# Patient Record
Sex: MALE | Race: WHITE | NOT HISPANIC OR LATINO | Employment: FULL TIME | URBAN - METROPOLITAN AREA
[De-identification: names, ages, dates, MRNs, and addresses within clinical notes are randomized per-mention and may not be internally consistent; named-entity substitution may affect disease eponyms.]

---

## 2022-06-09 ENCOUNTER — TELEPHONE (OUTPATIENT)
Dept: SLEEP CENTER | Facility: CLINIC | Age: 67
End: 2022-06-09

## 2022-06-09 NOTE — TELEPHONE ENCOUNTER
----- Message from Anshu Wilson MD sent at 6/9/2022 10:24 AM EDT -----  Approved  ----- Message -----  From: Holly Chavarria  Sent: 5/6/5362  12:25 PM EDT  To: [facility] Provider    This DIAGNOSTIC sleep study needs approval      If approved please sign and return to clerical pool  If denied please include reasons why  Also provide alternative testing if warranted  Please sign and return to clerical pool

## 2022-09-01 ENCOUNTER — HOSPITAL ENCOUNTER (OUTPATIENT)
Dept: SLEEP CENTER | Facility: HOSPITAL | Age: 67
Discharge: HOME | End: 2022-09-01
Payer: COMMERCIAL

## 2022-09-01 DIAGNOSIS — R06.81 APNEA: ICD-10-CM

## 2022-09-01 PROCEDURE — 95810 POLYSOM 6/> YRS 4/> PARAM: CPT

## 2022-09-02 NOTE — PROGRESS NOTES
Sleep Study Documentation    Pre-Sleep Study       Sleep testing procedure explained to patient:YES    Patient napped prior to study:YES- less than 30 minutes  Napped after 2PM: yes    Caffeine:Dayshift worker after 12PM   Caffeine use:YES- energy drinks  1 serving    Alcohol:Dayshift workers after 5PM: Alcohol use:NO    Typical day for patient:YES       Study Documentation    Sleep Study Indications: Snoring and EDS    Sleep Study: Diagnostic   Snore:Severe  Supplemental O2: no    O2 flow rate (L/min) range N/A  O2 flow rate (L/min) final N/A  Minimum SaO2 60%  Baseline SaO2 96%        EKG abnormalities: yes:  EPOCH example and comments: PVCs  Epoch 831, 876, 890, 941, etc    EEG abnormalities: no    Sleep Study Recorded < 2 hours: N/A    Sleep Study Recorded > 2 hours but incomplete study: N/A    Sleep Study Recorded 6 hours but no sleep obtained: NO    Patient classification: retired       Post-Sleep Study    Medication used at bedtime or during sleep study:NO    Patient reports time it took to fall asleep:20 to 30 minutes    Patient reports waking up during study:3 or more times  Patient reports returning to sleep in 10 to 30 minutes  Patient reports sleeping 4 to 6 hours with dreaming  Patient reports sleep during study:typical    Patient rated sleepiness: Somewhat sleepy or tired    PAP treatment:no

## 2022-09-09 ENCOUNTER — TELEPHONE (OUTPATIENT)
Dept: SLEEP CENTER | Facility: CLINIC | Age: 67
End: 2022-09-09

## 2022-09-09 NOTE — TELEPHONE ENCOUNTER
Called patient and advised sleep study resulted and shows severe GERMÁN and hypoxia  Per study order, PCP Dr Major Reyes to follow up  Patient states he will reach out to Dr Major Reyes

## 2024-11-11 ENCOUNTER — HOSPITAL ENCOUNTER (EMERGENCY)
Facility: HOSPITAL | Age: 69
Discharge: HOME | End: 2024-11-12
Attending: EMERGENCY MEDICINE
Payer: COMMERCIAL

## 2024-11-11 DIAGNOSIS — I10 ESSENTIAL HYPERTENSION: ICD-10-CM

## 2024-11-11 DIAGNOSIS — K80.50 BILIARY COLIC: Primary | ICD-10-CM

## 2024-11-11 LAB
ALBUMIN SERPL BCG-MCNC: 4.5 G/DL (ref 3.5–5)
ALP SERPL-CCNC: 81 U/L (ref 34–104)
ALT SERPL W P-5'-P-CCNC: 38 U/L (ref 7–52)
ANION GAP SERPL CALCULATED.3IONS-SCNC: 7 MMOL/L (ref 4–13)
AST SERPL W P-5'-P-CCNC: 31 U/L (ref 13–39)
BASOPHILS # BLD AUTO: 0.06 THOUSANDS/ÂΜL (ref 0–0.1)
BASOPHILS NFR BLD AUTO: 1 % (ref 0–1)
BILIRUB SERPL-MCNC: 0.81 MG/DL (ref 0.2–1)
BUN SERPL-MCNC: 18 MG/DL (ref 5–25)
CALCIUM SERPL-MCNC: 9.9 MG/DL (ref 8.4–10.2)
CHLORIDE SERPL-SCNC: 102 MMOL/L (ref 96–108)
CO2 SERPL-SCNC: 28 MMOL/L (ref 21–32)
CREAT SERPL-MCNC: 1.09 MG/DL (ref 0.6–1.3)
EOSINOPHIL # BLD AUTO: 0.04 THOUSAND/ÂΜL (ref 0–0.61)
EOSINOPHIL NFR BLD AUTO: 0 % (ref 0–6)
ERYTHROCYTE [DISTWIDTH] IN BLOOD BY AUTOMATED COUNT: 13.4 % (ref 11.6–15.1)
GFR SERPL CREATININE-BSD FRML MDRD: 69 ML/MIN/1.73SQ M
GLUCOSE SERPL-MCNC: 156 MG/DL (ref 65–140)
HCT VFR BLD AUTO: 43.8 % (ref 36.5–49.3)
HGB BLD-MCNC: 14.7 G/DL (ref 12–17)
IMM GRANULOCYTES # BLD AUTO: 0.06 THOUSAND/UL (ref 0–0.2)
IMM GRANULOCYTES NFR BLD AUTO: 1 % (ref 0–2)
LIPASE SERPL-CCNC: 21 U/L (ref 11–82)
LYMPHOCYTES # BLD AUTO: 0.59 THOUSANDS/ÂΜL (ref 0.6–4.47)
LYMPHOCYTES NFR BLD AUTO: 5 % (ref 14–44)
MCH RBC QN AUTO: 29.5 PG (ref 26.8–34.3)
MCHC RBC AUTO-ENTMCNC: 33.6 G/DL (ref 31.4–37.4)
MCV RBC AUTO: 88 FL (ref 82–98)
MONOCYTES # BLD AUTO: 0.38 THOUSAND/ÂΜL (ref 0.17–1.22)
MONOCYTES NFR BLD AUTO: 4 % (ref 4–12)
NEUTROPHILS # BLD AUTO: 9.76 THOUSANDS/ÂΜL (ref 1.85–7.62)
NEUTS SEG NFR BLD AUTO: 89 % (ref 43–75)
NRBC BLD AUTO-RTO: 0 /100 WBCS
PLATELET # BLD AUTO: 294 THOUSANDS/UL (ref 149–390)
PMV BLD AUTO: 10 FL (ref 8.9–12.7)
POTASSIUM SERPL-SCNC: 3.7 MMOL/L (ref 3.5–5.3)
PROT SERPL-MCNC: 7.8 G/DL (ref 6.4–8.4)
RBC # BLD AUTO: 4.99 MILLION/UL (ref 3.88–5.62)
SODIUM SERPL-SCNC: 137 MMOL/L (ref 135–147)
WBC # BLD AUTO: 10.89 THOUSAND/UL (ref 4.31–10.16)

## 2024-11-11 PROCEDURE — 99284 EMERGENCY DEPT VISIT MOD MDM: CPT

## 2024-11-11 PROCEDURE — 99285 EMERGENCY DEPT VISIT HI MDM: CPT | Performed by: EMERGENCY MEDICINE

## 2024-11-11 PROCEDURE — 36415 COLL VENOUS BLD VENIPUNCTURE: CPT | Performed by: EMERGENCY MEDICINE

## 2024-11-11 PROCEDURE — 80053 COMPREHEN METABOLIC PANEL: CPT | Performed by: EMERGENCY MEDICINE

## 2024-11-11 PROCEDURE — 85025 COMPLETE CBC W/AUTO DIFF WBC: CPT | Performed by: EMERGENCY MEDICINE

## 2024-11-11 PROCEDURE — 96361 HYDRATE IV INFUSION ADD-ON: CPT

## 2024-11-11 PROCEDURE — 93005 ELECTROCARDIOGRAM TRACING: CPT

## 2024-11-11 PROCEDURE — 96375 TX/PRO/DX INJ NEW DRUG ADDON: CPT

## 2024-11-11 PROCEDURE — 96374 THER/PROPH/DIAG INJ IV PUSH: CPT

## 2024-11-11 PROCEDURE — 83690 ASSAY OF LIPASE: CPT | Performed by: EMERGENCY MEDICINE

## 2024-11-11 RX ORDER — MORPHINE SULFATE 4 MG/ML
4 INJECTION, SOLUTION INTRAMUSCULAR; INTRAVENOUS ONCE
Status: COMPLETED | OUTPATIENT
Start: 2024-11-11 | End: 2024-11-11

## 2024-11-11 RX ORDER — ONDANSETRON 2 MG/ML
4 INJECTION INTRAMUSCULAR; INTRAVENOUS ONCE
Status: COMPLETED | OUTPATIENT
Start: 2024-11-11 | End: 2024-11-11

## 2024-11-11 RX ADMIN — MORPHINE SULFATE 4 MG: 4 INJECTION INTRAVENOUS at 23:18

## 2024-11-11 RX ADMIN — SODIUM CHLORIDE 1000 ML: 0.9 INJECTION, SOLUTION INTRAVENOUS at 23:18

## 2024-11-11 RX ADMIN — ONDANSETRON 4 MG: 2 INJECTION, SOLUTION INTRAMUSCULAR; INTRAVENOUS at 23:18

## 2024-11-12 ENCOUNTER — APPOINTMENT (EMERGENCY)
Dept: CT IMAGING | Facility: HOSPITAL | Age: 69
End: 2024-11-12
Payer: COMMERCIAL

## 2024-11-12 VITALS
BODY MASS INDEX: 34.36 KG/M2 | TEMPERATURE: 97.4 F | WEIGHT: 240 LBS | HEIGHT: 70 IN | DIASTOLIC BLOOD PRESSURE: 80 MMHG | HEART RATE: 80 BPM | OXYGEN SATURATION: 94 % | SYSTOLIC BLOOD PRESSURE: 190 MMHG | RESPIRATION RATE: 16 BRPM

## 2024-11-12 LAB
ATRIAL RATE: 66 BPM
P AXIS: 56 DEGREES
PR INTERVAL: 190 MS
QRS AXIS: 6 DEGREES
QRSD INTERVAL: 96 MS
QT INTERVAL: 398 MS
QTC INTERVAL: 417 MS
T WAVE AXIS: 36 DEGREES
VENTRICULAR RATE: 66 BPM

## 2024-11-12 PROCEDURE — 96361 HYDRATE IV INFUSION ADD-ON: CPT

## 2024-11-12 PROCEDURE — 96375 TX/PRO/DX INJ NEW DRUG ADDON: CPT

## 2024-11-12 PROCEDURE — 93010 ELECTROCARDIOGRAM REPORT: CPT | Performed by: INTERNAL MEDICINE

## 2024-11-12 PROCEDURE — 74177 CT ABD & PELVIS W/CONTRAST: CPT

## 2024-11-12 RX ORDER — KETOROLAC TROMETHAMINE 30 MG/ML
15 INJECTION, SOLUTION INTRAMUSCULAR; INTRAVENOUS ONCE
Status: COMPLETED | OUTPATIENT
Start: 2024-11-12 | End: 2024-11-12

## 2024-11-12 RX ADMIN — IOHEXOL 100 ML: 350 INJECTION, SOLUTION INTRAVENOUS at 00:45

## 2024-11-12 RX ADMIN — KETOROLAC TROMETHAMINE 15 MG: 30 INJECTION, SOLUTION INTRAMUSCULAR; INTRAVENOUS at 02:09

## 2024-11-12 NOTE — DISCHARGE INSTRUCTIONS
You have been seen for abdominal pain. Your CT scan demonstrates gallstones which I suspect are the cause of your symptoms. Please take motrin if your pain returns. Return to the emergency department if you develop worsening pain, vomiting, fevers or any other symptoms of concern. Please follow up with general surgery by calling the number provided.    Your blood pressure is elevated on your visit today. When left untreated, the damage that high blood pressure does to your circulatory system is a significant contributing factor to heart attack, stroke, chronic kidney disease and other health threats. Please arrange for a blood pressure recheck with a PCP within the next week for further evaluation.

## 2024-11-12 NOTE — ED PROVIDER NOTES
Time reflects when diagnosis was documented in both MDM as applicable and the Disposition within this note       Time User Action Codes Description Comment    11/12/2024  2:03 AM Ranyd Kirk [K80.50] Biliary colic     11/12/2024  2:04 AM Randy Kirk [I10] Essential hypertension           ED Disposition       ED Disposition   Discharge    Condition   Stable    Date/Time   Tue Nov 12, 2024  2:03 AM    Comment   Armen Chowdhury discharge to home/self care.                   Assessment & Plan       Medical Decision Making    69 y.o. male presenting for epigastric pain and vomiting.  BP elevated, otherwise VSS.  Will order labs and CT to evaluate for anemia, electrolyte abnormality, DARYL, biliary disease, cholelithiasis, choledocholithiasis or pancreatitis.  Will obtain EKG however less likely ACS or arrhythmia.  Differential would include biliary colic or gastritis.  Will treat symptomatically.    Reassessment: BP elevated, otherwise VSS.  No vomiting during ED course. Pain improved.  Reviewed labs and CT results with patient and wife in detail.  Clinically symptoms correlate with biliary colic.  No evidence of sepsis, cholecystitis or choledocholithiasis.  Will advise supportive care and outpatient general surgery followup.    Disposition: I have discussed with the patient our plan to discharge them from the ED and the patient is in agreement with this plan.     Discharge Plan: PRN motrin if pain returns. Will refer to general surgery for further evaluation. Advised PCP visit regarding elevated BP however may be attributable to acute pain. Discussed possibility of disease progression and future risk of cholecystitis or choledocholithiasis. RTED precautions emphasized. The patient was provided a written after visit summary with strict RTED precautions.     Followup: I have discussed with the patient plan to follow up with their PCP and general surgery. Contact information provided in AVS.    Amount and/or  Complexity of Data Reviewed  Labs: ordered.  Radiology: ordered.    Risk  Prescription drug management.        ED Course as of 11/12/24 0217 Mon Nov 11, 2024 2254 Procedure Note: EKG  Date/Time: 11/11/24 10:54 PM   Interpreted by: Randy Kirk DO  Indications / Diagnosis: epigastric pain  ECG reviewed by me, the ED Provider: yes   The EKG demonstrates:  Rhythm: normal sinus rhythm 66 BPM  Intervals: Normal CA and QT intervals  Axis: Normal axis  QRS/Blocks: Normal QRS  ST Changes: No acute ST/T waves changes. No ROSELINE. No TWI.   2254 Procedure Note: EKG  Date/Time: 11/11/24 10:54 PM   Interpreted by: Randy Kirk DO  Indications / Diagnosis: vomiting, abdominal pain  ECG reviewed by me, the ED Provider: yes   The EKG demonstrates:  Rhythm: normal sinus rhythm 66 BPM  Intervals: Normal CA and QT intervals  Axis: Normal axis  QRS/Blocks: Normal QRS  ST Changes: No acute ST/T waves changes. No ROSELINE. No TWI.   Tue Nov 12, 2024   0023 Patient reassessed.  Pain is improved though remains present. Patient declines additional analgesia. Reviewed lab results.  Pending CT for dispo.   0202 Patient reassessed.  Pain has improved.  No vomiting during ED course.  I reviewed the labs and CT results in detail.  No evidence of sepsis. No evidence of cholecystitis or choledocholithiasis.  Suspect symptoms related to biliary colic.  Will treat symptomatically and encourage outpatient general surgery follow-up.       Medications   sodium chloride 0.9 % bolus 1,000 mL (0 mL Intravenous Stopped 11/12/24 0208)   ondansetron (ZOFRAN) injection 4 mg (4 mg Intravenous Given 11/11/24 2318)   morphine injection 4 mg (4 mg Intravenous Given 11/11/24 2318)   iohexol (OMNIPAQUE) 350 MG/ML injection (MULTI-DOSE) 100 mL (100 mL Intravenous Given 11/12/24 0045)   ketorolac (TORADOL) injection 15 mg (15 mg Intravenous Given 11/12/24 0209)       ED Risk Strat Scores                           SBIRT 20yo+      Flowsheet Row Most Recent Value    Initial Alcohol Screen: US AUDIT-C     1. How often do you have a drink containing alcohol? 0 Filed at: 11/11/2024 2253   2. How many drinks containing alcohol do you have on a typical day you are drinking?  0 Filed at: 11/11/2024 2253   3b. FEMALE Any Age, or MALE 65+: How often do you have 4 or more drinks on one occassion? 0 Filed at: 11/11/2024 2253   Audit-C Score 0 Filed at: 11/11/2024 2253   AMRITA: How many times in the past year have you...    Used an illegal drug or used a prescription medication for non-medical reasons? Never Filed at: 11/11/2024 2253                            History of Present Illness       Chief Complaint   Patient presents with    Epigastric Pain     Upper abdominal pain with vomiting.  Took omeprazole after vomiting       Past Medical History:   Diagnosis Date    Diabetes mellitus (HCC)     HLD (hyperlipidemia)     Hypertension       Past Surgical History:   Procedure Laterality Date    APPENDECTOMY        Family History   Problem Relation Age of Onset    Cancer Father     Heart block Brother       Social History     Tobacco Use    Smoking status: Never    Smokeless tobacco: Never   Vaping Use    Vaping status: Never Used   Substance Use Topics    Alcohol use: Never    Drug use: Never      E-Cigarette/Vaping    E-Cigarette Use Never User       E-Cigarette/Vaping Substances      I have reviewed and agree with the history as documented.     Armen Chowdhury is a 69 y.o. year old male with PMH of PE, DM, HTN, HLD presenting to the [facility] ED for vomiting and abdominal pain. Patient has had 4.5 hours of upper abdominal pain. Currently rated as 7/10, constant and nonradiating. Patient's had several episodes of vomiting. No history of similar pain previously. No recorded fevers. He was having normal bowel movements today and is passing flatus. Patient denies associated chest pain or dyspnea. The patient has taken omeprazole at home for symptomatic treatment.  Surgical history notable for  appendectomy.        History provided by:  Medical records and patient   used: No    Epigastric Pain  Associated symptoms: abdominal pain, nausea and vomiting    Associated symptoms: no back pain, no fever and no shortness of breath        Review of Systems   Constitutional:  Negative for chills and fever.   Respiratory:  Negative for shortness of breath.    Cardiovascular:  Negative for chest pain.   Gastrointestinal:  Positive for abdominal pain, nausea and vomiting. Negative for constipation and diarrhea.   Genitourinary:  Negative for dysuria, flank pain and hematuria.   Musculoskeletal:  Negative for back pain.   All other systems reviewed and are negative.          Objective       ED Triage Vitals   Temperature Pulse Blood Pressure Respirations SpO2 Patient Position - Orthostatic VS   11/11/24 2249 11/11/24 2249 11/11/24 2249 11/11/24 2249 11/11/24 2249 --   (!) 97.4 °F (36.3 °C) 60 (!) 199/99 18 97 %       Temp src Heart Rate Source BP Location FiO2 (%) Pain Score    -- 11/11/24 2300 11/11/24 2249 -- 11/11/24 2318     Monitor Right arm  7      Vitals      Date and Time Temp Pulse SpO2 Resp BP Pain Score FACES Pain Rating User   11/12/24 0210 -- 80 94 % 16 190/80 -- --    11/12/24 0209 -- -- -- -- -- 5 --    11/12/24 0130 -- 72 94 % 18 188/85 -- --    11/12/24 0030 -- 64 95 % 17 196/88 -- --    11/12/24 0000 -- 70 98 % 16 191/86 -- --    11/11/24 2318 -- -- -- -- -- 7 --    11/11/24 2300 -- 64 97 % 19 181/97 -- --    11/11/24 2249 97.4 °F (36.3 °C) 60 97 % 18 199/99 -- -- SV            Physical Exam  Vitals and nursing note reviewed.   Constitutional:       General: He is not in acute distress.     Appearance: He is well-developed. He is not toxic-appearing or diaphoretic.   HENT:      Head: Normocephalic and atraumatic.      Nose: No congestion.   Eyes:      General:         Right eye: No discharge.         Left eye: No discharge.   Cardiovascular:      Rate and Rhythm:  Normal rate and regular rhythm.   Pulmonary:      Effort: Pulmonary effort is normal. No respiratory distress.      Breath sounds: Normal breath sounds. No wheezing or rales.   Abdominal:      General: There is no distension.      Tenderness: There is abdominal tenderness in the right upper quadrant and epigastric area. There is no right CVA tenderness, left CVA tenderness, guarding or rebound.   Musculoskeletal:      Cervical back: Normal range of motion. No rigidity.   Skin:     General: Skin is warm.      Capillary Refill: Capillary refill takes less than 2 seconds.   Neurological:      Mental Status: He is alert and oriented to person, place, and time.   Psychiatric:         Mood and Affect: Mood normal.         Behavior: Behavior normal.         Results Reviewed       Procedure Component Value Units Date/Time    Comprehensive metabolic panel [[id number]]  (Abnormal) Collected: 11/11/24 2317    Lab Status: Final result Specimen: Blood from Arm, Right Updated: 11/11/24 2345     Sodium 137 mmol/L      Potassium 3.7 mmol/L      Chloride 102 mmol/L      CO2 28 mmol/L      ANION GAP 7 mmol/L      BUN 18 mg/dL      Creatinine 1.09 mg/dL      Glucose 156 mg/dL      Calcium 9.9 mg/dL      AST 31 U/L      ALT 38 U/L      Alkaline Phosphatase 81 U/L      Total Protein 7.8 g/dL      Albumin 4.5 g/dL      Total Bilirubin 0.81 mg/dL      eGFR 69 ml/min/1.73sq m     Narrative:      National Kid[employer] Disease Foundation guidelines for Chronic Kidney Disease (CKD):     Stage 1 with normal or high GFR (GFR > 90 mL/min/1.73 square meters)    Stage 2 Mild CKD (GFR = 60-89 mL/min/1.73 square meters)    Stage 3A Moderate CKD (GFR = 45-59 mL/min/1.73 square meters)    Stage 3B Moderate CKD (GFR = 30-44 mL/min/1.73 square meters)    Stage 4 Severe CKD (GFR = 15-29 mL/min/1.73 square meters)    Stage 5 End Stage CKD (GFR <15 mL/min/1.73 square meters)  Note: GFR calculation is accurate only with a steady state creatinine    Lipase  [[id number]]  (Normal) Collected: 11/11/24 2317    Lab Status: Final result Specimen: Blood from Arm, Right Updated: 11/11/24 2345     Lipase 21 u/L     CBC and differential [[id number]]  (Abnormal) Collected: 11/11/24 2317    Lab Status: Final result Specimen: Blood from Arm, Right Updated: 11/11/24 2330     WBC 10.89 Thousand/uL      RBC 4.99 Million/uL      Hemoglobin 14.7 g/dL      Hematocrit 43.8 %      MCV 88 fL      MCH 29.5 pg      MCHC 33.6 g/dL      RDW 13.4 %      MPV 10.0 fL      Platelets 294 Thousands/uL      nRBC 0 /100 WBCs      Segmented % 89 %      Immature Grans % 1 %      Lymphocytes % 5 %      Monocytes % 4 %      Eosinophils Relative 0 %      Basophils Relative 1 %      Absolute Neutrophils 9.76 Thousands/µL      Absolute Immature Grans 0.06 Thousand/uL      Absolute Lymphocytes 0.59 Thousands/µL      Absolute Monocytes 0.38 Thousand/µL      Eosinophils Absolute 0.04 Thousand/µL      Basophils Absolute 0.06 Thousands/µL             CT abdomen pelvis with contrast   Final Interpretation by Matt Villalobos MD (11/12 0146)      Cholelithiasis without evidence for acute cholecystitis or significant biliary ductal dilatation. No findings of bowel obstruction, inflammation, obstructive uropathy, free air, or free fluid noted. Ancillary findings detailed above.         Workstation performed: [id number]             Procedures    ED Medication and Procedure Management   None     Patient's Medications    No medications on file       ED SEPSIS DOCUMENTATION   Time reflects when diagnosis was documented in both MDM as applicable and the Disposition within this note       Time User Action Codes Description Comment    11/12/2024  2:03 AM Randy Kirk [K80.50] Biliary colic     11/12/2024  2:04 AM Randy Kirk [I10] Essential hypertension                  Randy Kirk,   11/12/24 0212

## 2024-11-12 NOTE — ED NOTES
Patient requesting water while here for nausea/vomiting/abd pain. Educated patient that he should not be eating/drinking if he is vomiting and to wait for provider evaluation.      Gladys Ramírez RN  11/11/24 2291

## 2024-12-04 RX ORDER — LISINOPRIL AND HYDROCHLOROTHIAZIDE 10; 12.5 MG/1; MG/1
1 TABLET ORAL DAILY
COMMUNITY
Start: 2024-08-30 | End: 2025-08-30

## 2024-12-04 RX ORDER — DAPAGLIFLOZIN 5 MG/1
TABLET, FILM COATED ORAL
COMMUNITY
Start: 2024-11-27

## 2024-12-04 RX ORDER — GLIMEPIRIDE 2 MG/1
2 TABLET ORAL
COMMUNITY

## 2024-12-04 RX ORDER — ATORVASTATIN CALCIUM 40 MG/1
40 TABLET, FILM COATED ORAL DAILY
COMMUNITY

## 2024-12-05 ENCOUNTER — CONSULT (OUTPATIENT)
Dept: SURGERY | Facility: HOSPITAL | Age: 69
End: 2024-12-05
Attending: EMERGENCY MEDICINE
Payer: COMMERCIAL

## 2024-12-05 VITALS
HEART RATE: 66 BPM | TEMPERATURE: 97.7 F | HEIGHT: 70 IN | DIASTOLIC BLOOD PRESSURE: 88 MMHG | SYSTOLIC BLOOD PRESSURE: 157 MMHG | BODY MASS INDEX: 35.45 KG/M2 | WEIGHT: 247.6 LBS

## 2024-12-05 DIAGNOSIS — K80.20 CALCULUS OF GALLBLADDER WITHOUT CHOLECYSTITIS WITHOUT OBSTRUCTION: ICD-10-CM

## 2024-12-05 DIAGNOSIS — K80.50 BILIARY COLIC: ICD-10-CM

## 2024-12-05 DIAGNOSIS — R10.10 PAIN OF UPPER ABDOMEN: Primary | ICD-10-CM

## 2024-12-05 PROCEDURE — 99203 OFFICE O/P NEW LOW 30 MIN: CPT | Performed by: SURGERY

## 2024-12-18 NOTE — PROGRESS NOTES
Assessment/Plan:   Armen Chowdhury is a 69 y.o.male who is here for Consult (GALLBLADDER// ED VISIT 11/11/24//PT stated he has no current pain in the abdomen, bowel movements have changed he is constipated and also has loose bowels at other times, eating habits have increased. When he went to the ED he had no appetite, lots of vomiting he stated, and also epigastric pain.)  .    Plan: Abdominal pain/Cholelithiasis - discussed operative vs conservative mgt, surgical approaches, risks and benefits and patient understands that his pain may not resolve after surgery. Pt understands and will consider his options and keep a pain log for now    Preoperative Clearance: None    HPI:  Armen Chowdhury is a 69 y.o.male who was referred for evaluation of Consult (GALLBLADDER// ED VISIT 11/11/24//PT stated he has no current pain in the abdomen, bowel movements have changed he is constipated and also has loose bowels at other times, eating habits have increased. When he went to the ED he had no appetite, lots of vomiting he stated, and also epigastric pain.)  .    Currently no abd pain currently.     ROS:  General ROS: negative  negative for - chills, fatigue, fever or night sweats, weight loss  Respiratory ROS: no cough, shortness of breath, or wheezing  Cardiovascular ROS: no chest pain or dyspnea on exertion  Genito-Urinary ROS: no dysuria, trouble voiding, or hematuria  Musculoskeletal ROS: negative for - gait disturbance, joint pain or muscle pain  Neurological ROS: no TIA or stroke symptoms  No abd sxs currently    [unfilled]  Patient has no known allergies.    Current Outpatient Medications:     atorvastatin (LIPITOR) 40 mg tablet, Take 40 mg by mouth daily, Disp: , Rfl:     Cholecalciferol 125 MCG (5000 UT) TABS, Take 5,000 Units by mouth daily, Disp: , Rfl:     glucose blood test strip, daily, Disp: , Rfl:     lisinopril-hydrochlorothiazide (PRINZIDE,ZESTORETIC) 10-12.5 MG per tablet, Take 1 tablet by mouth daily, Disp: ,  "Rfl:     metFORMIN (GLUCOPHAGE) 1000 MG tablet, Take 1,000 mg by mouth (Patient taking differently: Take 500 mg by mouth), Disp: , Rfl:     Farxiga 5 MG TABS, , Disp: , Rfl:     glimepiride (AMARYL) 2 mg tablet, Take 2 mg by mouth, Disp: , Rfl:   Past Medical History:   Diagnosis Date    Diabetes mellitus (HCC)     HLD (hyperlipidemia)     Hypertension      Past Surgical History:   Procedure Laterality Date    APPENDECTOMY       Family History   Problem Relation Age of Onset    Cancer Father     Heart block Brother       reports that he has never smoked. He has never used smokeless tobacco. He reports that he does not drink alcohol and does not use drugs.    Labs:   Lab Results   Component Value Date    WBC 10.89 (H) 11/11/2024    HGB 14.7 11/11/2024     11/11/2024     Lab Results   Component Value Date    ALT 38 11/11/2024    AST 31 11/11/2024     This SmartLink has not been configured with any valid records.       PHYSICAL EXAM  General Appearance:    Alert, cooperative, no distress,    Head:    Normocephalic without obvious abnormality   Eyes:    PERRL, conjunctiva/corneas clear, EOM's intact        Neck:   Supple, no adenopathy, no JVD   Back:     Symmetric, no spinal or CVA tenderness   Lungs:     Clear to auscultation bilaterally, no wheezing or rhonchi   Heart:    Regular rate and rhythm, S1 and S2 normal, no murmur   Abdomen:     Soft +BS ND NT   Extremities:   Extremities normal. No clubbing, cyanosis or edema   Psych:   Normal Affect, AOx3.    Neurologic:  Skin:   CNII-XII intact. Strength symmetric, speech intact    Warm, dry, intact, no visible rashes or lesions         Physical Exam              Some portions of this record may have been generated with voice recognition software. There may be translation, syntax,  or grammatical errors. Occasional wrong word or \"sound-a-like\" substitutions may have occurred due to the inherent limitations of the voice recognition software. Read the chart " carefully and recognize, using context, where substitutions may have occurred. If you have any questions, please contact the dictating provider for clarification or correction, as needed. This encounter has been coded by a non-certified coder.